# Patient Record
Sex: FEMALE | Race: WHITE | NOT HISPANIC OR LATINO | Employment: OTHER | URBAN - METROPOLITAN AREA
[De-identification: names, ages, dates, MRNs, and addresses within clinical notes are randomized per-mention and may not be internally consistent; named-entity substitution may affect disease eponyms.]

---

## 2017-04-11 ENCOUNTER — RECORDS - HEALTHEAST (OUTPATIENT)
Dept: LAB | Facility: CLINIC | Age: 82
End: 2017-04-11

## 2017-04-11 LAB
CHOLEST SERPL-MCNC: 278 MG/DL
FASTING STATUS PATIENT QL REPORTED: ABNORMAL
HDLC SERPL-MCNC: 51 MG/DL
LDLC SERPL CALC-MCNC: 187 MG/DL
TRIGL SERPL-MCNC: 198 MG/DL

## 2018-04-20 ENCOUNTER — RECORDS - HEALTHEAST (OUTPATIENT)
Dept: LAB | Facility: CLINIC | Age: 83
End: 2018-04-20

## 2018-04-20 LAB
CHOLEST SERPL-MCNC: 277 MG/DL
FASTING STATUS PATIENT QL REPORTED: ABNORMAL
HDLC SERPL-MCNC: 59 MG/DL
LDLC SERPL CALC-MCNC: 196 MG/DL
TRIGL SERPL-MCNC: 108 MG/DL

## 2018-04-22 LAB — BACTERIA SPEC CULT: ABNORMAL

## 2018-06-27 ENCOUNTER — RECORDS - HEALTHEAST (OUTPATIENT)
Dept: LAB | Facility: CLINIC | Age: 83
End: 2018-06-27

## 2018-06-27 LAB
AST SERPL W P-5'-P-CCNC: 20 U/L (ref 0–40)
CHOLEST SERPL-MCNC: 267 MG/DL
FASTING STATUS PATIENT QL REPORTED: ABNORMAL
HDLC SERPL-MCNC: 49 MG/DL
LDLC SERPL CALC-MCNC: 179 MG/DL
TRIGL SERPL-MCNC: 193 MG/DL

## 2019-02-19 ENCOUNTER — RECORDS - HEALTHEAST (OUTPATIENT)
Dept: LAB | Facility: CLINIC | Age: 84
End: 2019-02-19

## 2019-02-20 LAB — BACTERIA SPEC CULT: NO GROWTH

## 2019-07-12 ENCOUNTER — RECORDS - HEALTHEAST (OUTPATIENT)
Dept: LAB | Facility: CLINIC | Age: 84
End: 2019-07-12

## 2019-07-12 LAB
ALBUMIN SERPL-MCNC: 3.9 G/DL (ref 3.5–5)
ALP SERPL-CCNC: 65 U/L (ref 45–120)
ALT SERPL W P-5'-P-CCNC: 15 U/L (ref 0–45)
ANION GAP SERPL CALCULATED.3IONS-SCNC: 7 MMOL/L (ref 5–18)
AST SERPL W P-5'-P-CCNC: 21 U/L (ref 0–40)
BILIRUB SERPL-MCNC: 0.4 MG/DL (ref 0–1)
BUN SERPL-MCNC: 15 MG/DL (ref 8–28)
C REACTIVE PROTEIN LHE: 0.1 MG/DL (ref 0–0.8)
CALCIUM SERPL-MCNC: 10.2 MG/DL (ref 8.5–10.5)
CHLORIDE BLD-SCNC: 105 MMOL/L (ref 98–107)
CHOLEST SERPL-MCNC: 255 MG/DL
CO2 SERPL-SCNC: 28 MMOL/L (ref 22–31)
CREAT SERPL-MCNC: 0.67 MG/DL (ref 0.6–1.1)
FASTING STATUS PATIENT QL REPORTED: ABNORMAL
GFR SERPL CREATININE-BSD FRML MDRD: >60 ML/MIN/1.73M2
GLUCOSE BLD-MCNC: 72 MG/DL (ref 70–125)
HDLC SERPL-MCNC: 57 MG/DL
LDLC SERPL CALC-MCNC: 166 MG/DL
POTASSIUM BLD-SCNC: 4.5 MMOL/L (ref 3.5–5)
PROT SERPL-MCNC: 6.8 G/DL (ref 6–8)
SODIUM SERPL-SCNC: 140 MMOL/L (ref 136–145)
TRIGL SERPL-MCNC: 160 MG/DL

## 2020-09-21 ENCOUNTER — RECORDS - HEALTHEAST (OUTPATIENT)
Dept: LAB | Facility: CLINIC | Age: 85
End: 2020-09-21

## 2020-09-21 LAB
ERYTHROCYTE [DISTWIDTH] IN BLOOD BY AUTOMATED COUNT: 13.3 % (ref 11–14.5)
HCT VFR BLD AUTO: 45.6 % (ref 35–47)
HGB BLD-MCNC: 15 G/DL (ref 12–16)
MCH RBC QN AUTO: 30.3 PG (ref 27–34)
MCHC RBC AUTO-ENTMCNC: 32.9 G/DL (ref 32–36)
MCV RBC AUTO: 92 FL (ref 80–100)
PLATELET # BLD AUTO: 159 THOU/UL (ref 140–440)
PMV BLD AUTO: 10.5 FL (ref 8.5–12.5)
RBC # BLD AUTO: 4.95 MILL/UL (ref 3.8–5.4)
WBC: 4.2 THOU/UL (ref 4–11)

## 2020-09-24 LAB
25(OH)D3 SERPL-MCNC: 54.8 NG/ML (ref 30–80)
ALBUMIN SERPL-MCNC: 3.8 G/DL (ref 3.5–5)
ALP SERPL-CCNC: 66 U/L (ref 45–120)
ALT SERPL W P-5'-P-CCNC: 33 U/L (ref 0–45)
ANION GAP SERPL CALCULATED.3IONS-SCNC: 14 MMOL/L (ref 5–18)
AST SERPL W P-5'-P-CCNC: 38 U/L (ref 0–40)
BILIRUB SERPL-MCNC: 0.5 MG/DL (ref 0–1)
BUN SERPL-MCNC: 13 MG/DL (ref 8–28)
CALCIUM SERPL-MCNC: 9.3 MG/DL (ref 8.5–10.5)
CHLORIDE BLD-SCNC: 105 MMOL/L (ref 98–107)
CHOLEST SERPL-MCNC: 215 MG/DL
CO2 SERPL-SCNC: 22 MMOL/L (ref 22–31)
CREAT SERPL-MCNC: 0.73 MG/DL (ref 0.6–1.1)
FASTING STATUS PATIENT QL REPORTED: ABNORMAL
GFR SERPL CREATININE-BSD FRML MDRD: >60 ML/MIN/1.73M2
GLUCOSE BLD-MCNC: 90 MG/DL (ref 70–125)
HDLC SERPL-MCNC: 53 MG/DL
LDLC SERPL CALC-MCNC: 133 MG/DL
POTASSIUM BLD-SCNC: 4.7 MMOL/L (ref 3.5–5)
PROT SERPL-MCNC: 7.1 G/DL (ref 6–8)
SODIUM SERPL-SCNC: 141 MMOL/L (ref 136–145)
TRIGL SERPL-MCNC: 146 MG/DL
TSH SERPL DL<=0.005 MIU/L-ACNC: 1.57 UIU/ML (ref 0.3–5)
VIT B12 SERPL-MCNC: 909 PG/ML (ref 213–816)

## 2020-10-09 ENCOUNTER — AMBULATORY - HEALTHEAST (OUTPATIENT)
Dept: CARDIOLOGY | Facility: CLINIC | Age: 85
End: 2020-10-09

## 2020-10-09 ENCOUNTER — COMMUNICATION - HEALTHEAST (OUTPATIENT)
Dept: CARDIOLOGY | Facility: CLINIC | Age: 85
End: 2020-10-09

## 2020-10-09 ENCOUNTER — RECORDS - HEALTHEAST (OUTPATIENT)
Dept: ADMINISTRATIVE | Facility: OTHER | Age: 85
End: 2020-10-09

## 2020-10-12 ENCOUNTER — OFFICE VISIT - HEALTHEAST (OUTPATIENT)
Dept: CARDIOLOGY | Facility: CLINIC | Age: 85
End: 2020-10-12

## 2020-10-12 DIAGNOSIS — R06.09 DOE (DYSPNEA ON EXERTION): ICD-10-CM

## 2020-10-12 DIAGNOSIS — E78.00 PURE HYPERCHOLESTEROLEMIA: ICD-10-CM

## 2020-10-12 RX ORDER — IRON ASPGLY/C/B12/FA/CA-TH/SUC 70-150-1MG
1 TABLET ORAL DAILY
Status: SHIPPED | COMMUNITY
Start: 2020-10-12

## 2020-10-12 RX ORDER — UBIDECARENONE 100 MG
100 CAPSULE ORAL DAILY
Status: SHIPPED | COMMUNITY
Start: 2020-10-12

## 2020-10-12 RX ORDER — GLUCOSAMINE HCL 500 MG
1000 TABLET ORAL DAILY
Status: SHIPPED | COMMUNITY
Start: 2020-10-12

## 2020-10-12 RX ORDER — CHLORAL HYDRATE 500 MG
1 CAPSULE ORAL DAILY
Status: SHIPPED | COMMUNITY
Start: 2020-10-12

## 2020-10-12 RX ORDER — PRAVASTATIN SODIUM 40 MG
40 TABLET ORAL AT BEDTIME
Status: SHIPPED | COMMUNITY
Start: 2020-10-12

## 2020-10-12 ASSESSMENT — MIFFLIN-ST. JEOR: SCORE: 1049.63

## 2020-10-13 LAB — BNP SERPL-MCNC: 19 PG/ML (ref 0–167)

## 2020-11-10 ENCOUNTER — COMMUNICATION - HEALTHEAST (OUTPATIENT)
Dept: CARDIOLOGY | Facility: CLINIC | Age: 85
End: 2020-11-10

## 2020-11-10 DIAGNOSIS — E78.00 PURE HYPERCHOLESTEROLEMIA: ICD-10-CM

## 2020-11-10 DIAGNOSIS — R07.9 CHEST PAIN, UNSPECIFIED: ICD-10-CM

## 2020-11-10 DIAGNOSIS — R06.09 DOE (DYSPNEA ON EXERTION): ICD-10-CM

## 2020-11-13 ENCOUNTER — HOSPITAL ENCOUNTER (OUTPATIENT)
Dept: CARDIOLOGY | Facility: HOSPITAL | Age: 85
Discharge: HOME | End: 2020-11-13
Attending: INTERNAL MEDICINE

## 2020-11-13 DIAGNOSIS — E78.00 PURE HYPERCHOLESTEROLEMIA: ICD-10-CM

## 2020-11-13 DIAGNOSIS — R06.09 DOE (DYSPNEA ON EXERTION): ICD-10-CM

## 2020-11-13 LAB
AORTIC ROOT: 2.7 CM
AORTIC VALVE MEAN VELOCITY: 84.9 CM/S
AR DECEL SLOPE: 2500 MM/S2
AR PEAK VELOCITY: 430 CM/S
ASCENDING AORTA: 2.5 CM
AV DIMENSIONLESS INDEX VTI: 0.9
AV MEAN GRADIENT: 3 MMHG
AV PEAK GRADIENT: 5.7 MMHG
AV REGURGITANT PEAK GRADIENT: 74 MMHG
AV REGURGITATION PRESSURE HALF TIME: 504 MS
AV VALVE AREA: 2.6 CM2
BSA FOR ECHO PROCEDURE: 1.63 M2
CV BLOOD PRESSURE: ABNORMAL MMHG
CV ECHO HEIGHT: 60 IN
CV ECHO WEIGHT: 139 LBS
DOP CALC AO PEAK VEL: 119 CM/S
DOP CALC AO VTI: 25.7 CM
DOP CALC LVOT AREA: 2.83 CM2
DOP CALC LVOT DIAMETER: 1.9 CM
DOP CALC LVOT STROKE VOLUME: 67.2 CM3
DOP CALCLVOT PEAK VEL VTI: 23.7 CM
EJECTION FRACTION: 59 % (ref 55–75)
FRACTIONAL SHORTENING: 23.5 % (ref 28–44)
INTERVENTRICULAR SEPTUM IN END DIASTOLE: 1.1 CM (ref 0.6–0.9)
IVS/PW RATIO: 1.1
LA AREA 1: 14.9 CM2
LA AREA 2: 13.8 CM2
LEFT ATRIUM LENGTH: 4.52 CM
LEFT ATRIUM SIZE: 2.7 CM
LEFT ATRIUM TO AORTIC ROOT RATIO: 1 NO UNITS
LEFT ATRIUM VOLUME INDEX: 23.7 ML/M2
LEFT ATRIUM VOLUME: 38.7 ML
LEFT VENTRICLE CARDIAC INDEX: 2.6 L/MIN/M2
LEFT VENTRICLE CARDIAC OUTPUT: 4.2 L/MIN
LEFT VENTRICLE DIASTOLIC VOLUME INDEX: 33.1 CM3/M2 (ref 29–61)
LEFT VENTRICLE DIASTOLIC VOLUME: 54 CM3 (ref 46–106)
LEFT VENTRICLE HEART RATE: 62 BPM
LEFT VENTRICLE MASS INDEX: 65.2 G/M2
LEFT VENTRICLE SYSTOLIC VOLUME INDEX: 13.5 CM3/M2 (ref 8–24)
LEFT VENTRICLE SYSTOLIC VOLUME: 22 CM3 (ref 14–42)
LEFT VENTRICULAR INTERNAL DIMENSION IN DIASTOLE: 3.4 CM (ref 3.8–5.2)
LEFT VENTRICULAR INTERNAL DIMENSION IN SYSTOLE: 2.6 CM (ref 2.2–3.5)
LEFT VENTRICULAR MASS: 106.3 G
LEFT VENTRICULAR OUTFLOW TRACT MEAN GRADIENT: 3 MMHG
LEFT VENTRICULAR OUTFLOW TRACT MEAN VELOCITY: 74.7 CM/S
LEFT VENTRICULAR POSTERIOR WALL IN END DIASTOLE: 1 CM (ref 0.6–0.9)
LV STROKE VOLUME INDEX: 41.2 ML/M2
MITRAL VALVE E/A RATIO: 0.7
MV AVERAGE E/E' RATIO: 8.4 CM/S
MV DECELERATION TIME: 153 MS
MV E'TISSUE VEL-LAT: 7.21 CM/S
MV E'TISSUE VEL-MED: 7.7 CM/S
MV LATERAL E/E' RATIO: 8.7
MV MEDIAL E/E' RATIO: 8.2
MV PEAK A VELOCITY: 95.2 CM/S
MV PEAK E VELOCITY: 62.9 CM/S
NUC REST DIASTOLIC VOLUME INDEX: 2224 LBS
NUC REST SYSTOLIC VOLUME INDEX: 60 IN
TRICUSPID REGURGITATION PEAK PRESSURE GRADIENT: 20.8 MMHG
TRICUSPID VALVE ANULAR PLANE SYSTOLIC EXCURSION: 1.9 CM
TRICUSPID VALVE PEAK REGURGITANT VELOCITY: 228 CM/S

## 2020-11-13 ASSESSMENT — MIFFLIN-ST. JEOR: SCORE: 1002

## 2020-12-01 ENCOUNTER — HOSPITAL ENCOUNTER (OUTPATIENT)
Dept: CT IMAGING | Facility: CLINIC | Age: 85
Discharge: HOME | End: 2020-12-01
Attending: INTERNAL MEDICINE

## 2020-12-01 DIAGNOSIS — R07.9 CHEST PAIN, UNSPECIFIED: ICD-10-CM

## 2020-12-01 DIAGNOSIS — E78.00 PURE HYPERCHOLESTEROLEMIA: ICD-10-CM

## 2020-12-01 DIAGNOSIS — R06.09 DOE (DYSPNEA ON EXERTION): ICD-10-CM

## 2020-12-01 LAB
BSA FOR ECHO PROCEDURE: 0 M2
CREAT BLD-MCNC: 0.7 MG/DL (ref 0.6–1.1)
CV CALCIUM SCORE AGATSTON LM: 0
CV CALCIUM SCORING AGATSON LAD: 77
CV CALCIUM SCORING AGATSTON CX: 29
CV CALCIUM SCORING AGATSTON RCA: 1
CV CALCIUM SCORING AGATSTON TOTAL: 107
GFR SERPL CREATININE-BSD FRML MDRD: >60 ML/MIN/1.73M2
LEFT VENTRICLE HEART RATE: 76 BPM

## 2020-12-10 ENCOUNTER — COMMUNICATION - HEALTHEAST (OUTPATIENT)
Dept: CARDIOLOGY | Facility: CLINIC | Age: 85
End: 2020-12-10

## 2020-12-10 DIAGNOSIS — K76.9 LIVER LESION: ICD-10-CM

## 2020-12-10 DIAGNOSIS — R93.89 ABNORMAL CHEST CT: ICD-10-CM

## 2020-12-15 ENCOUNTER — HOSPITAL ENCOUNTER (OUTPATIENT)
Dept: ULTRASOUND IMAGING | Facility: HOSPITAL | Age: 85
Discharge: HOME | End: 2020-12-15
Attending: INTERNAL MEDICINE

## 2020-12-15 DIAGNOSIS — R93.89 ABNORMAL CHEST CT: ICD-10-CM

## 2020-12-15 DIAGNOSIS — K76.9 LIVER LESION: ICD-10-CM

## 2020-12-18 ENCOUNTER — AMBULATORY - HEALTHEAST (OUTPATIENT)
Dept: CARDIOLOGY | Facility: CLINIC | Age: 85
End: 2020-12-18

## 2020-12-18 ENCOUNTER — RECORDS - HEALTHEAST (OUTPATIENT)
Dept: ADMINISTRATIVE | Facility: OTHER | Age: 85
End: 2020-12-18

## 2020-12-22 ENCOUNTER — COMMUNICATION - HEALTHEAST (OUTPATIENT)
Dept: CARDIOLOGY | Facility: CLINIC | Age: 85
End: 2020-12-22

## 2020-12-23 ENCOUNTER — OFFICE VISIT - HEALTHEAST (OUTPATIENT)
Dept: CARDIOLOGY | Facility: CLINIC | Age: 85
End: 2020-12-23

## 2020-12-23 ENCOUNTER — AMBULATORY - HEALTHEAST (OUTPATIENT)
Dept: CARDIOLOGY | Facility: CLINIC | Age: 85
End: 2020-12-23

## 2020-12-23 DIAGNOSIS — U07.1 CLINICAL DIAGNOSIS OF COVID-19: ICD-10-CM

## 2020-12-23 DIAGNOSIS — I25.10 CORONARY ARTERY DISEASE DUE TO LIPID RICH PLAQUE: ICD-10-CM

## 2020-12-23 DIAGNOSIS — R06.09 DOE (DYSPNEA ON EXERTION): ICD-10-CM

## 2020-12-23 DIAGNOSIS — E78.00 PURE HYPERCHOLESTEROLEMIA: ICD-10-CM

## 2020-12-23 DIAGNOSIS — I25.83 CORONARY ARTERY DISEASE DUE TO LIPID RICH PLAQUE: ICD-10-CM

## 2020-12-23 RX ORDER — ALBUTEROL SULFATE 90 UG/1
2 AEROSOL, METERED RESPIRATORY (INHALATION) EVERY 4 HOURS PRN
Status: SHIPPED | COMMUNITY
Start: 2020-12-04

## 2020-12-23 ASSESSMENT — MIFFLIN-ST. JEOR: SCORE: 1056.43

## 2021-01-04 ENCOUNTER — RECORDS - HEALTHEAST (OUTPATIENT)
Dept: LAB | Facility: CLINIC | Age: 86
End: 2021-01-04

## 2021-01-06 LAB — BACTERIA SPEC CULT: ABNORMAL

## 2021-01-11 ENCOUNTER — SURGERY - HEALTHEAST (OUTPATIENT)
Dept: CARDIOLOGY | Facility: CLINIC | Age: 86
End: 2021-01-11

## 2021-01-11 ENCOUNTER — COMMUNICATION - HEALTHEAST (OUTPATIENT)
Dept: CARDIOLOGY | Facility: CLINIC | Age: 86
End: 2021-01-11

## 2021-01-11 ENCOUNTER — RECORDS - HEALTHEAST (OUTPATIENT)
Dept: LAB | Facility: CLINIC | Age: 86
End: 2021-01-11

## 2021-01-11 DIAGNOSIS — I25.10 CORONARY ARTERY DISEASE DUE TO LIPID RICH PLAQUE: ICD-10-CM

## 2021-01-11 DIAGNOSIS — I25.83 CORONARY ARTERY DISEASE DUE TO LIPID RICH PLAQUE: ICD-10-CM

## 2021-01-12 LAB — BACTERIA SPEC CULT: NO GROWTH

## 2021-01-15 ENCOUNTER — SURGERY - HEALTHEAST (OUTPATIENT)
Dept: CARDIOLOGY | Facility: HOSPITAL | Age: 86
End: 2021-01-15

## 2021-01-15 ASSESSMENT — MIFFLIN-ST. JEOR
SCORE: 1040.1
SCORE: 1043.73

## 2021-01-16 ASSESSMENT — MIFFLIN-ST. JEOR: SCORE: 1043.27

## 2021-03-03 ENCOUNTER — RECORDS - HEALTHEAST (OUTPATIENT)
Dept: LAB | Facility: CLINIC | Age: 86
End: 2021-03-03

## 2021-03-03 LAB
ALBUMIN SERPL-MCNC: 3.7 G/DL (ref 3.5–5)
ALP SERPL-CCNC: 70 U/L (ref 45–120)
ALT SERPL W P-5'-P-CCNC: 16 U/L (ref 0–45)
ANION GAP SERPL CALCULATED.3IONS-SCNC: 9 MMOL/L (ref 5–18)
AST SERPL W P-5'-P-CCNC: 20 U/L (ref 0–40)
BILIRUB SERPL-MCNC: 0.4 MG/DL (ref 0–1)
BUN SERPL-MCNC: 16 MG/DL (ref 8–28)
CALCIUM SERPL-MCNC: 8.8 MG/DL (ref 8.5–10.5)
CHLORIDE BLD-SCNC: 106 MMOL/L (ref 98–107)
CHOLEST SERPL-MCNC: 181 MG/DL
CO2 SERPL-SCNC: 27 MMOL/L (ref 22–31)
CREAT SERPL-MCNC: 0.7 MG/DL (ref 0.6–1.1)
FASTING STATUS PATIENT QL REPORTED: ABNORMAL
GFR SERPL CREATININE-BSD FRML MDRD: >60 ML/MIN/1.73M2
GLUCOSE BLD-MCNC: 102 MG/DL (ref 70–125)
HDLC SERPL-MCNC: 52 MG/DL
LDLC SERPL CALC-MCNC: 92 MG/DL
POTASSIUM BLD-SCNC: 3.9 MMOL/L (ref 3.5–5)
PROT SERPL-MCNC: 7 G/DL (ref 6–8)
SODIUM SERPL-SCNC: 142 MMOL/L (ref 136–145)
TRIGL SERPL-MCNC: 187 MG/DL
VIT B12 SERPL-MCNC: 590 PG/ML (ref 213–816)

## 2021-06-04 VITALS
HEIGHT: 63 IN | SYSTOLIC BLOOD PRESSURE: 126 MMHG | RESPIRATION RATE: 14 BRPM | HEART RATE: 82 BPM | WEIGHT: 139 LBS | BODY MASS INDEX: 24.63 KG/M2 | OXYGEN SATURATION: 98 % | DIASTOLIC BLOOD PRESSURE: 82 MMHG

## 2021-06-04 VITALS — WEIGHT: 139 LBS | BODY MASS INDEX: 27.29 KG/M2 | HEIGHT: 60 IN

## 2021-06-05 VITALS — HEIGHT: 64 IN | BODY MASS INDEX: 22.9 KG/M2 | WEIGHT: 134.1 LBS

## 2021-06-05 VITALS
SYSTOLIC BLOOD PRESSURE: 124 MMHG | WEIGHT: 137 LBS | BODY MASS INDEX: 23.39 KG/M2 | HEIGHT: 64 IN | HEART RATE: 103 BPM | RESPIRATION RATE: 14 BRPM | OXYGEN SATURATION: 98 % | DIASTOLIC BLOOD PRESSURE: 64 MMHG

## 2021-06-12 NOTE — PATIENT INSTRUCTIONS - HE
Ms. Emely KEVIN Coleman,  It certainly was nice to meet you today.  Per our conversation you're having some shortness of breath.  This could represent heart failure or an abnormal heart and the reason I am checking the ultrasound of the heart, known as an ECHOCARDIOGRAM and the heart stress test know as a NUCLEAR STRESS TEST. I will also check the blood test for heart failure, altho doubt today or BNP.  We will call you the results of these tests.   Sandro Perales

## 2021-06-12 NOTE — TELEPHONE ENCOUNTER
Wellness Screening Tool  Symptom Screening:  Do you have one of the following NEW symptoms:    Fever (subjective or >100.0)?  No    A new cough?  No    Shortness of breath?  No     Chills? No     New loss of taste or smell? No     Generalized body aches? No     New persistent headache? No     New sore throat? No     Nausea, vomiting, or diarrhea?  No    Within the past 2 weeks, have you been exposed to someone with a known positive illness below:    COVID-19 (known or suspected)?  No    Chicken pox?  No    Mealses?  No    Pertussis?  No    Patient notified of visitor policy- They may have one person accompany them to their appointment, but they will need to wear a mask and will be screened upon arrival for symptoms: Yes  Pt informed to wear a mask: Yes  Pt notified if they develop any symptoms listed above, prior to their appointment, they are to call the clinic directly at [phone] for further instructions.  Yes  Patient's appointment status: Patient will be seen in clinic as scheduled on 10/12

## 2021-06-13 NOTE — TELEPHONE ENCOUNTER
----- Message from Leeann James sent at 12/10/2020 10:07 AM CST -----  Regarding: [facility] PT / US FOR LIVER  General phone call:    Caller: Emely Barker     Primary cardiologist: VANIA     Detailed reason for call: Emely states that she received her CTA results in a letter and is requesting for an ultrasound of her liver to be ordered by [facility]. Please return pt's call.     Best phone number: [phone]    Best time to contact: Anytime     Ok to leave a detailed message? Yes     Device? No     Additional Info:

## 2021-06-13 NOTE — TELEPHONE ENCOUNTER
Abbey Perales MD   12/1/2020  4:08 PM CST      Please inform this 85-year-old lady that CTA suggests disease in the ostial right coronary artery, as well as disease in the proximal diagonal, neither of them appear to be critical but given her symptoms and high cholesterol I suggest we perform invasive angiography and possible intervention.  Lastly, given CAT scan suggesting a shadow in the liver would like to get liver ultrasound.  Can you arrange these?             Order placed for ultrasound of liver. Called patient and updated her that this was done. She verbalized understanding and had previously wanted to hold off until discussing with [facility] on 12/23 but did receive her results in the mail and was comfortable with doing the ultrasound. Informed her that order was placed and that [facility] will call her to arrange. She prefers [place]'s location. We will see her on 12/23 to discuss cor poss. -[facility] [place]

## 2021-06-13 NOTE — TELEPHONE ENCOUNTER
----- Message from Abbey Perales MD sent at 11/9/2020  9:01 AM CST -----  Insurance reviewer called me today, they will approve a CTA for this patient rather than a stress nuclear, can we then please change order to CTA with calcium score?  I did explain that this is not as specific and if it does suggest disease will end up needing to do a nuclear stress in any event and they are fine with that, they approved CTA.LF    Reason for Exam  Priority: Routine  Dyspnea on exertion   Dx: MIXON (dyspnea on exertion) [R06.00 (ICD-10-CM)]; Pure hypercholesterolemia [E78.00 (ICD-10-CM)]     CCTA with calcium score ordered.  Pt updated and verbalized understanding.  NM stress canceled.  yanely

## 2021-06-14 NOTE — TELEPHONE ENCOUNTER
Emely Coleman  [address]  [facility] [address]  [phone] (home)     Primary cardiologist:  VANIA  PCP:  Coretta Julian MD  Procedure: cor angio poss pci  H&P completed by:  12/23/20  Case MD:  RO/DAISY  Admit date and time:  1/15  Case start time:  Arrival time 0600  Ordering MD:  VANIA  Diagnosis:  Abnormal CTA  Anticoagulation: None  CPAP: No  Bypass Grafts: No  Renal Issues: No  Allergies: confirmed allergy list  Diabetic?: No  Device?: No      Angiogram Teaching    Reason for Visit:  Telephone call to discuss pre-procedure education in preparation for: 1/11/2021    Procedure Prep:  EKG results obtained, dated: on admission  Pertinent test results obtained - Viewable in Epic, dated: abnormal CTA  Hemogram results obtained: on admission  Basic Metabolic Panel results obtained: on admission  Lipid Profile results obtained: on admission    Pre-procedure instructions  Patient instructed to be NPO after midnight.  Patient instructed to arrange for transportation home following procedure.  Patient instructed to have a responsible adult with them for 24 hours post-procedure.  Post-procedure follow up process.  Conscious sedation discussed.  The patient was sent the pre-procedure letter (If requested)    Pre-procedure medication instructions  Patient instructed on antiplatelet medication.  Continue medications as scheduled, with a small amount of water on the day of the procedure unless indicated.  Patient instructed to take 325 mg of Aspirin am of procedure: Yes  Other medication: instructed to take aspirin the morning of the procedure with sips of water. Pt takes other medications at night time.   *PATIENTS RECORDS AVAILABLE IN [facility] UNLESS OTHERWISE INDICATED*    *Order set was entered on this date: 1/11      Patient Active Problem List   Diagnosis     MIXON (dyspnea on exertion)     Pure hypercholesterolemia     Clinical diagnosis of COVID-19     Coronary artery disease due to lipid rich plaque       Current  Outpatient Medications   Medication Sig Dispense Refill     albuterol (PROAIR HFA;PROVENTIL HFA;VENTOLIN HFA) 90 mcg/actuation inhaler Inhale 2 puffs every 4 (four) hours as needed.       coenzyme Q10 (CO Q-10) 100 mg capsule Take 100 mg by mouth daily.       fish oil-omega-3 fatty acids 300-1,000 mg capsule Take 1 capsule by mouth daily.       FLAXSEED ORAL Take 5 mL by mouth daily. Patient reported she takes one teaspoonful of ground flaxseeds daily.       GARLIC ORAL Take 1 tablet by mouth daily.       glucosamine HCl 500 mg Tab Take 1,000 mg by mouth daily.       multivitamin-iron-folic acid  mg-mcg Tab Take 1 tablet by mouth daily.       mv-mn/iron/folic acid/herb 190 (VITAMIN D3 COMPLETE ORAL) Take 1 tablet by mouth daily.       pravastatin (PRAVACHOL) 40 MG tablet Take 40 mg by mouth at bedtime.       No current facility-administered medications for this visit.        Allergies   Allergen Reactions     Chocolate Other (See Comments)     Raspberry Other (See Comments)     Strawberry Other (See Comments)       Plan  Pt's daughter will be   No COVID test needed, pt had a positive swab 12/4/2020  Patient ready for procedure    RN Maribell Trujillo

## 2021-06-14 NOTE — TELEPHONE ENCOUNTER
Wellness Screening Tool  Symptom Screening:  Do you have one of the following NEW symptoms:    Fever (subjective or >100.0)?  No    A new cough?  No    Shortness of breath?  No     Chills? No     New loss of taste or smell? No     Generalized body aches? No     New persistent headache? No     New sore throat? No     Nausea, vomiting, or diarrhea?  No    Within the past 2 weeks, have you been exposed to someone with a known positive illness below:    COVID-19 (known or suspected)?  No    Chicken pox?  No    Mealses?  No    Pertussis?  No    Patient notified of visitor policy- No visitors are allowed to accompany the patient at this time. daughter will be coming with  Pt informed to wear a mask: Yes  Pt notified if they develop any symptoms listed above, prior to their appointment, they are to call the clinic directly at [phone] for further instructions.  Yes  Patient's appointment status: Patient will be seen in clinic as scheduled on 12/23       
losing balance/decreased weight-shifting ability

## 2021-06-14 NOTE — TELEPHONE ENCOUNTER
----- Message from Darlyn Mohr sent at 1/5/2021 12:14 PM CST -----  Regarding: [facility] ordering  CA P.PCI scheduled 01/15 admit at 6 with CJP or PTK

## 2021-06-14 NOTE — PROGRESS NOTES
Case request already placed by [facility]. Encounter opened in error. -[facility] [place]

## 2021-06-14 NOTE — PATIENT INSTRUCTIONS - HE
Ms Emely Coleman,  I enjoyed visiting with you again today.  I am not too concerned with brief chest discomfort but more concerned about the CAT scan as well as the shortness of breath.    Per our conversation we will arrange coronary angiography and possible stenting if needed.  I would like you to increase your pravastatin up to 80 mg.  I will plan on seeing you afterwards.  Have a good holiday.  Sandro Perales

## 2021-06-16 PROBLEM — I25.83 CORONARY ARTERY DISEASE DUE TO LIPID RICH PLAQUE: Status: ACTIVE | Noted: 2020-12-23

## 2021-06-16 PROBLEM — I25.10 CORONARY ARTERY DISEASE DUE TO LIPID RICH PLAQUE: Status: ACTIVE | Noted: 2020-12-23

## 2021-06-16 PROBLEM — U07.1 CLINICAL DIAGNOSIS OF COVID-19: Status: ACTIVE | Noted: 2020-12-23

## 2021-06-16 PROBLEM — E78.2 MIXED HYPERLIPIDEMIA: Chronic | Status: ACTIVE | Noted: 2021-01-16

## 2021-06-29 NOTE — PROGRESS NOTES
Progress Notes by Abbey Perales MD at 10/12/2020  1:50 PM     Author: Abbey Perales MD Service: -- Author Type: Physician    Filed: 10/12/2020  2:35 PM Encounter Date: 10/12/2020 Status: Signed    : Abbey Perales MD (Physician)         [facility] Heart Care Office Consult     Assessment:     1. MIXON (dyspnea on exertion) -associated with dry cough, does not necessarily sound ischemic.  Normal hemoglobin and normal chest CT.  Do not hear any murmurs on exam but will check echocardiogram for ejection fraction.  Will check BNP today looking for heart failure.  Lastly, agree with exercise stress nuclear and if medium or large sized area of ischemia we then pursue angiography.  If all these tests are unremarkable, then would assume we could quite possibly be related to side effect of her current medications and I would suggest stopping all medicines and supplements, if symptoms improve reinstitute one at a time to see which causes the issue, if stopping all supplements does not make things better then would just simply go back on them and do noncardiac or pulmonary work-up.  ECG is unremarkable and hemoglobin is preserved at 15.   2. Pure hypercholesterolemia -unacceptable numbers a total 215 and LDL of 133, however if coronary artery disease with certainly needs up dose of statin.      Plan:   1.  Check BNP today and if elevated addressed with diuretics.  2.  Check echocardiogram and if issues addressed.  3.  Check exercise stress nuclear and if medium or large sized area of ischemia angiography.  4.  Follow-up only if above tests abnormal.  5.  If above tests normal, would suggest discontinuing all medicines and supplements, and if symptoms resolve slowly adding back above to see if the offending agent can be identified.    History of Present Illness:   Thank you for asking the [facility] Heart Care team to see Emely Coleman a 85 y.o.  female  in consultation  to evaluate shortness of breath.    Patient tells me she has been in usual state of health which includes chronic dry cough for about 3 years.  Over this period of time she is also had a musculoskeletal type pain sensation under the left breast coming and going at rest.  Over the last 4 months she is noted increased shortness of breath on activity, her daughter pointed it out to her when she came to answer the phone.  There is no PND, orthopnea, increased peripheral edema, syncope or dizziness.    Past Medical History:   Pure hypercholesterolemia    Past history is negative for cancer, tuberculosis, diabetes mellitus, myocardial infarction,  rheumatic fever, hypertension, cerebrovascular accident, chronic kidney disease, peptic ulcer disease, chronic obstructive pulmonary disease, or thyroid disorder.    Past Surgical History:   No past surgical history on file.    Family History:   Family history positive for mother  in her 80s from heart failure, no history of coronary artery disease in the family.    Social History:   She lives at home independently with her , he has Alzheimer's and she is his caregiver, reports that she has never smoked. She has never used smokeless tobacco. She reports that she does not use drugs or alcohol. The primary care physician is Coretta Julian MD    Meds:   Scheduled Meds:  Current Outpatient Medications   Medication Sig Dispense Refill   ? coenzyme Q10 (CO Q-10) 100 mg capsule Take 100 mg by mouth daily.     ? fish oil-omega-3 fatty acids 300-1,000 mg capsule Take 1 capsule by mouth daily.     ? FLAXSEED ORAL Take 5 mL by mouth daily. Patient reported she takes one teaspoonful of ground flaxseeds daily.     ? GARLIC ORAL Take 1 tablet by mouth daily.     ? glucosamine HCl 500 mg Tab Take 1,000 mg by mouth daily.     ? multivitamin-iron-folic acid  mg-mcg Tab Take 1 tablet by mouth daily.     ? mv-mn/iron/folic acid/herb 190 (VITAMIN D3 COMPLETE ORAL) Take 1 tablet by mouth daily.     ? pravastatin  "(PRAVACHOL) 40 MG tablet Take 40 mg by mouth at bedtime.       No current facility-administered medications for this visit.        PRN Meds:.    Allergies:   Chocolate, Raspberry, and Strawberry    Objective:      Physical Exam  139 lb (63 kg)  5' 3\" (1.6 m)  Body mass index is 24.62 kg/m .  /82 (Patient Site: Left Arm, Patient Position: Sitting, Cuff Size: Adult Regular)   Pulse 82   Resp 14   Ht 5' 3\" (1.6 m)   Wt 139 lb (63 kg)   SpO2 98%   BMI 24.62 kg/m      General Appearance:   Alert, cooperative and in no acute distress.   HEENT:  No scleral icterus; the mucous membranes were pink and moist.   Neck: JVP normal. No thyromegaly. No HJR   Chest: The spine was straight. The chest was symmetric.   Lungs:   Respirations unlabored; the lungs are clear to auscultation.   Cardiovascular:   S1 and S2 without murmur, clicks or rubs. Brachial, radial, carotid and posterior tibial pulses are intact and symetrical.  No carotid bruits noted   Abdomen:  No organomegaly, masses, bruits, or tenderness. Bowels sounds are present   Extremities: No cyanosis, clubbing, or edema.   Skin: No xanthelasma.   Neurologic: Mood and affect are appropriate.         Lab Reviewed Personally by myself  Lab Results   Component Value Date     09/21/2020    K 4.7 09/21/2020     09/21/2020    CO2 22 09/21/2020    BUN 13 09/21/2020    CREATININE 0.73 09/21/2020    CALCIUM 9.3 09/21/2020     Lab Results   Component Value Date    WBC 4.2 09/21/2020    HGB 15.0 09/21/2020    HCT 45.6 09/21/2020    MCV 92 09/21/2020     09/21/2020     Lab Results   Component Value Date    CHOL 215 (H) 09/21/2020    TRIG 146 09/21/2020    HDL 53 09/21/2020     No results found for: BNP    ECG personally reviewed by myself shows normal sinus rhythm, within normal limits     Review of Systems:     Review of Systems:   General: WNL  Eyes: WNL  Ears/Nose/Throat: Hearing Loss  Lungs: Cough, Shortness of Breath  Heart: Shortness of Breath with " activity, Leg Swelling  Stomach: Heartburn  Bladder: WNL  Muscle/Joints: Muscle Pain  Skin: Rash  Nervous System: Daytime Sleepiness  Mental Health: Anxiety     Blood: WNL

## 2021-06-30 NOTE — PROGRESS NOTES
Progress Notes by Abbey Perales MD at 12/23/2020  2:50 PM     Author: Abbey Perales MD Service: -- Author Type: Physician    Filed: 12/23/2020  3:31 PM Encounter Date: 12/23/2020 Status: Signed    : Abbey Perales MD (Physician)           [facility]  Heart Care Clinic Follow-up Note    Assessment & Plan        1. Coronary artery disease due to lipid rich plaque -coronary CTA suggest normal left main, left anterior descending with mid 50 to 70% stenosis, diagonal normal, circumflex normal with first obtuse marginal artery 25 to 50% stenosis, and right coronary artery possibly with a 75% obstructive lesion in the proximal portion.  Given this, will arrange for invasive angiography and possible intervention.  Risk, benefits, constipation discussed with her and she is willing to proceed.   2. Pure hypercholesterolemia -cholesterol 215 with an LDL of 133 which is unacceptable.  If significant coronary artery disease would increase Pravachol up to 80 mg.   3. MIXON (dyspnea on exertion) -possibly an element of pulmonary fibrosis but also possibly coronary artery disease and as above need to evaluate for this.   4. Clinical diagnosis of COVID-19 -2 weeks ago on mild symptoms.     Plan  1 invasive angiography and possible intervention.  2.  Increase pravastatin to 80 mg.  3.. Follow-up with me thereafter.    Subjective  CC: 85-year-old white female being seen in follow-up today.  Since have seen her she was diagnosed with COVID-19 secondary to increased shortness of breath and a cough.  She still was at home independently taking care of her  who has Alzheimer's disease.  She does complain of shortness of breath on activity which is getting worse over the last 2 to 3 months.  She did have an atypical type chest stabbing, common going for 9 episodes each lasting less than a minute while at shampooing a rug.  No significant syncope, dizziness or peripheral edema.    Medications  Current  "Outpatient Medications   Medication Sig   ? albuterol (PROAIR HFA;PROVENTIL HFA;VENTOLIN HFA) 90 mcg/actuation inhaler Inhale 2 puffs every 4 (four) hours as needed.   ? coenzyme Q10 (CO Q-10) 100 mg capsule Take 100 mg by mouth daily.   ? fish oil-omega-3 fatty acids 300-1,000 mg capsule Take 1 capsule by mouth daily.   ? FLAXSEED ORAL Take 5 mL by mouth daily. Patient reported she takes one teaspoonful of ground flaxseeds daily.   ? GARLIC ORAL Take 1 tablet by mouth daily.   ? glucosamine HCl 500 mg Tab Take 1,000 mg by mouth daily.   ? multivitamin-iron-folic acid  mg-mcg Tab Take 1 tablet by mouth daily.   ? mv-mn/iron/folic acid/herb 190 (VITAMIN D3 COMPLETE ORAL) Take 1 tablet by mouth daily.   ? pravastatin (PRAVACHOL) 40 MG tablet Take 40 mg by mouth at bedtime.       Objective  /64 (Patient Site: Right Arm, Patient Position: Sitting, Cuff Size: Adult Regular)   Pulse (!) 103   Resp 14   Ht 5' 4\" (1.626 m)   Wt 137 lb (62.1 kg) Comment: With shoes.  SpO2 98%   BMI 23.52 kg/m      General Appearance:    Alert, cooperative, no distress, appears stated age   Head:    Normocephalic, without obvious abnormality, atraumatic   Throat:   Lips, mucosa, and tongue normal; teeth and gums normal   Neck:   Supple, symmetrical, trachea midline, no adenopathy;        thyroid:  No enlargement/tenderness/nodules; no carotid    bruit or JVD   Back:     Symmetric, no curvature, ROM normal, no CVA tenderness   Lungs:     Clear to auscultation bilaterally, respirations unlabored   Chest wall:    No tenderness or deformity   Heart:    Regular rate and rhythm, S1 and S2 normal, no murmur, rub   or gallop   Abdomen:     Soft, non-tender, bowel sounds active all four quadrants,     no masses, no organomegaly   Extremities:   Normal, atraumatic, no cyanosis or edema   Pulses:   2+ and symmetric all extremities   Skin:   Skin color, texture, turgor normal, no rashes or lesions     Results    Lab Results personally " reviewed   Lab Results   Component Value Date    CHOL 215 (H) 09/21/2020    CHOL 255 (H) 07/12/2019     Lab Results   Component Value Date    HDL 53 09/21/2020    HDL 57 07/12/2019     Lab Results   Component Value Date    LDLCALC 133 (H) 09/21/2020    LDLCALC 166 (H) 07/12/2019     Lab Results   Component Value Date    TRIG 146 09/21/2020    TRIG 160 (H) 07/12/2019     Lab Results   Component Value Date    WBC 5.8 12/21/2020    HGB 15.5 12/21/2020    HCT 46.9 12/21/2020     12/21/2020     Lab Results   Component Value Date    CREATININE 0.71 12/21/2020    BUN 15 12/21/2020     12/21/2020    K 3.9 12/21/2020    CO2 27 12/21/2020     Review of Systems:   General: Weight Loss  Eyes: WNL  Ears/Nose/Throat: WNL  Lungs: Cough, Shortness of Breath  Heart: Chest Pain, Shortness of Breath with activity  Stomach: WNL  Bladder: WNL  Muscle/Joints: WNL  Skin: WNL  Nervous System: WNL  Mental Health: WNL     Blood: WNL

## 2022-12-05 ENCOUNTER — HOSPITAL ENCOUNTER (EMERGENCY)
Facility: HOSPITAL | Age: 87
Discharge: HOME | End: 2022-12-05
Attending: EMERGENCY MEDICINE | Admitting: EMERGENCY MEDICINE
Payer: COMMERCIAL

## 2022-12-05 ENCOUNTER — APPOINTMENT (OUTPATIENT)
Dept: MRI IMAGING | Facility: HOSPITAL | Age: 87
End: 2022-12-05
Attending: EMERGENCY MEDICINE
Payer: COMMERCIAL

## 2022-12-05 ENCOUNTER — APPOINTMENT (OUTPATIENT)
Dept: CT IMAGING | Facility: HOSPITAL | Age: 87
End: 2022-12-05
Attending: STUDENT IN AN ORGANIZED HEALTH CARE EDUCATION/TRAINING PROGRAM
Payer: COMMERCIAL

## 2022-12-05 VITALS
SYSTOLIC BLOOD PRESSURE: 170 MMHG | OXYGEN SATURATION: 98 % | HEART RATE: 73 BPM | DIASTOLIC BLOOD PRESSURE: 77 MMHG | RESPIRATION RATE: 18 BRPM | TEMPERATURE: 97.8 F | BODY MASS INDEX: 21.46 KG/M2 | WEIGHT: 125 LBS

## 2022-12-05 DIAGNOSIS — G51.0 BELL'S PALSY: ICD-10-CM

## 2022-12-05 LAB
ANION GAP SERPL CALCULATED.3IONS-SCNC: 8 MMOL/L (ref 7–15)
APTT PPP: 27 SECONDS (ref 22–38)
BASOPHILS # BLD AUTO: 0 10E3/UL (ref 0–0.2)
BASOPHILS NFR BLD AUTO: 0 %
BUN SERPL-MCNC: 14 MG/DL (ref 8–23)
CALCIUM SERPL-MCNC: 9.5 MG/DL (ref 8.8–10.2)
CHLORIDE SERPL-SCNC: 102 MMOL/L (ref 98–107)
CREAT SERPL-MCNC: 0.73 MG/DL (ref 0.51–0.95)
DEPRECATED HCO3 PLAS-SCNC: 30 MMOL/L (ref 22–29)
EOSINOPHIL # BLD AUTO: 0.1 10E3/UL (ref 0–0.7)
EOSINOPHIL NFR BLD AUTO: 2 %
ERYTHROCYTE [DISTWIDTH] IN BLOOD BY AUTOMATED COUNT: 13.3 % (ref 10–15)
GFR SERPL CREATININE-BSD FRML MDRD: 80 ML/MIN/1.73M2
GLUCOSE BLDC GLUCOMTR-MCNC: 120 MG/DL (ref 70–99)
GLUCOSE SERPL-MCNC: 115 MG/DL (ref 70–99)
HCT VFR BLD AUTO: 47.1 % (ref 35–47)
HGB BLD-MCNC: 15.5 G/DL (ref 11.7–15.7)
IMM GRANULOCYTES # BLD: 0 10E3/UL
IMM GRANULOCYTES NFR BLD: 0 %
INR PPP: 0.99 (ref 0.85–1.15)
LYMPHOCYTES # BLD AUTO: 1.6 10E3/UL (ref 0.8–5.3)
LYMPHOCYTES NFR BLD AUTO: 26 %
MCH RBC QN AUTO: 30.5 PG (ref 26.5–33)
MCHC RBC AUTO-ENTMCNC: 32.9 G/DL (ref 31.5–36.5)
MCV RBC AUTO: 93 FL (ref 78–100)
MONOCYTES # BLD AUTO: 0.5 10E3/UL (ref 0–1.3)
MONOCYTES NFR BLD AUTO: 9 %
NEUTROPHILS # BLD AUTO: 3.9 10E3/UL (ref 1.6–8.3)
NEUTROPHILS NFR BLD AUTO: 63 %
NRBC # BLD AUTO: 0 10E3/UL
NRBC BLD AUTO-RTO: 0 /100
PLATELET # BLD AUTO: 213 10E3/UL (ref 150–450)
POTASSIUM SERPL-SCNC: 4.3 MMOL/L (ref 3.4–5.3)
RBC # BLD AUTO: 5.09 10E6/UL (ref 3.8–5.2)
SODIUM SERPL-SCNC: 140 MMOL/L (ref 136–145)
TROPONIN T SERPL HS-MCNC: 8 NG/L
WBC # BLD AUTO: 6.2 10E3/UL (ref 4–11)

## 2022-12-05 PROCEDURE — 99207 PR NO CHARGE LOS: CPT | Performed by: PHYSICIAN ASSISTANT

## 2022-12-05 PROCEDURE — 80048 BASIC METABOLIC PNL TOTAL CA: CPT | Performed by: EMERGENCY MEDICINE

## 2022-12-05 PROCEDURE — 70553 MRI BRAIN STEM W/O & W/DYE: CPT

## 2022-12-05 PROCEDURE — 93005 ELECTROCARDIOGRAM TRACING: CPT | Performed by: EMERGENCY MEDICINE

## 2022-12-05 PROCEDURE — 85730 THROMBOPLASTIN TIME PARTIAL: CPT | Performed by: EMERGENCY MEDICINE

## 2022-12-05 PROCEDURE — A9585 GADOBUTROL INJECTION: HCPCS | Performed by: EMERGENCY MEDICINE

## 2022-12-05 PROCEDURE — 85610 PROTHROMBIN TIME: CPT | Performed by: EMERGENCY MEDICINE

## 2022-12-05 PROCEDURE — 70496 CT ANGIOGRAPHY HEAD: CPT

## 2022-12-05 PROCEDURE — 36415 COLL VENOUS BLD VENIPUNCTURE: CPT | Performed by: EMERGENCY MEDICINE

## 2022-12-05 PROCEDURE — 99285 EMERGENCY DEPT VISIT HI MDM: CPT | Mod: 25

## 2022-12-05 PROCEDURE — 250N000011 HC RX IP 250 OP 636: Performed by: EMERGENCY MEDICINE

## 2022-12-05 PROCEDURE — 255N000002 HC RX 255 OP 636: Performed by: EMERGENCY MEDICINE

## 2022-12-05 PROCEDURE — 84484 ASSAY OF TROPONIN QUANT: CPT | Performed by: EMERGENCY MEDICINE

## 2022-12-05 PROCEDURE — 70498 CT ANGIOGRAPHY NECK: CPT

## 2022-12-05 PROCEDURE — 85025 COMPLETE CBC W/AUTO DIFF WBC: CPT | Performed by: EMERGENCY MEDICINE

## 2022-12-05 RX ORDER — VALACYCLOVIR HYDROCHLORIDE 1 G/1
1000 TABLET, FILM COATED ORAL 3 TIMES DAILY
Qty: 21 TABLET | Refills: 0 | Status: SHIPPED | OUTPATIENT
Start: 2022-12-05 | End: 2022-12-12

## 2022-12-05 RX ORDER — MINERAL OIL, PETROLATUM 425; 568 MG/G; MG/G
1 OINTMENT OPHTHALMIC AT BEDTIME
Qty: 7 G | Refills: 0 | Status: SHIPPED | OUTPATIENT
Start: 2022-12-05

## 2022-12-05 RX ORDER — IOPAMIDOL 755 MG/ML
75 INJECTION, SOLUTION INTRAVASCULAR ONCE
Status: COMPLETED | OUTPATIENT
Start: 2022-12-05 | End: 2022-12-05

## 2022-12-05 RX ORDER — GADOBUTROL 604.72 MG/ML
6 INJECTION INTRAVENOUS ONCE
Status: COMPLETED | OUTPATIENT
Start: 2022-12-05 | End: 2022-12-05

## 2022-12-05 RX ADMIN — IOPAMIDOL 75 ML: 755 INJECTION, SOLUTION INTRAVENOUS at 09:41

## 2022-12-05 RX ADMIN — GADOBUTROL 6 ML: 604.72 INJECTION INTRAVENOUS at 12:44

## 2022-12-05 ASSESSMENT — ACTIVITIES OF DAILY LIVING (ADL)
ADLS_ACUITY_SCORE: 35
ADLS_ACUITY_SCORE: 35

## 2022-12-05 NOTE — ED PROVIDER NOTES
EMERGENCY DEPARTMENT ENCOUNTER      NAME: Emely Coleman  AGE: 87 year old female  YOB: 1935  MRN: [id number]  EVALUATION DATE & TIME: No admission date for patient encounter.    PCP: Coretta Julian    ED PROVIDER: Germania Chavez M.D.      Chief Complaint   Patient presents with     Numbness         FINAL IMPRESSION:  1. Bell's palsy          ED COURSE & MEDICAL DECISION MAKING:    ED Course as of 12/05/22 1337   Mon Dec 05, 2022   0926 Pt with NIHSS 2 left lower facial droop and forehead sparing, last known normal 12.5h ago, wakeup symptoms apparent at 7am, with left ear pain and examination of Tms symmetrical and WNL. NIHSS 2 with slight sensation of decreased touch to right cheek compared to left, tier 2 stroke code called with forehead sparing in setting of patient with multiple stroke risk factors including HLD and CAD and forehead sparing and I spoke with stroke neurology, who agrees CT and CTA and will assess patient and determine MRI vs. Hyperacute MRI based on imaging and their assessment, stroke code and patient in MRI with  and    0936 CT head with no acute intracranial hemorrhage or mass apparent per [place] radiology, cTA underway   0944 Per stroke neuro routine MRI with and without contrast brain recommended, deescalate stroke code   0946 I spoke with neuroradiology who notes CTA reassuringly without acute pathology   1316 MRI brain reassuringly without new pathology apparent, neurology paged to discuss dispo   1334 I spoke with stroke neuro who agrees ok to discharge with diagnosis [place] Palsy       9:20 AM I introduced myself to the patient, obtained patient history, performed a physical exam, and discussed plan for ED workup including potential diagnostic laboratory/imaging studies and interventions. Tier II stroke code initiated.  9:28 AM Spoke with Nayla Madison PA-C, stroke neurology. Discussed patient's case.  9:44 AM Spoke with Nayla Madison PA-C, stroke neurology.  Discussed CT results and plan for MRI.  9:46 AM Stroke code deescalated.    Pertinent Labs & Imaging studies reviewed. (See chart for details)    N95 worn  A face shield was worn also  COVID PPE      At the conclusion of the encounter I discussed the results of all of the tests and the disposition. The questions were answered. The patient or family acknowledged understanding and was agreeable with the care plan.     MEDICATIONS GIVEN IN THE EMERGENCY:  Medications   iopamidol (ISOVUE-370) solution 75 mL (75 mLs Intravenous Given 12/5/22 0941)   gadobutrol (GADAVIST) injection 6 mL (6 mLs Intravenous Given 12/5/22 1244)       NEW PRESCRIPTIONS STARTED AT TODAY'S ER VISIT  New Prescriptions    VALACYCLOVIR (VALTREX) 1000 MG TABLET    Take 1 tablet (1,000 mg) by mouth 3 times daily for 7 days    WHITE PETROLATUM-MINERAL OIL (REFRESH LACRI-LUBE) OINT    Apply 1 inch to eye At Bedtime To prevent dry eye during [place] Palsy          =================================================================    HPI      Emely Coleman is a 87 year old female with PMHx of HLD and CAD who presents to the ED today via private car escorted by daughter with ear pain.    The patient has no history of stroke, diabetes, blood thinners, intracranial masses, or recent falls.    The patient reports she went to bed around 2100 last night and woke up around 0700 this morning with left ear pain and an atypical decreased sensation in her right cheek compared to the left. When she looked in the mirror this morning she noticed a left lower facial droop. The left ear pain is achy, constant, 5/10, and nonradiating. She denies tinnitus, hearing loss, vertigo, and fever.    REVIEW OF SYSTEMS   All other systems reviewed and are negative except as noted above in HPI.    PAST MEDICAL HISTORY:  Past Medical History:   Diagnosis Date     COVID-19     12/04/2020     MIXON (dyspnea on exertion)     per chart     Hyperlipidemia        PAST SURGICAL HISTORY:  Past  Surgical History:   Procedure Laterality Date     CV CORONARY ANGIOGRAM N/A 1/15/2021    Procedure: Coronary Angiogram;  Surgeon: Dylan Belle MD;  Location: [facility];  Service: Cardiology     HERNIA REPAIR      long time ago       CURRENT MEDICATIONS:    valACYclovir (VALTREX) 1000 mg tablet  White Petrolatum-Mineral Oil (REFRESH LACRI-LUBE) OINT  albuterol (PROAIR HFA;PROVENTIL HFA;VENTOLIN HFA) 90 mcg/actuation inhaler  cholecalciferol, vitamin D3, 1,000 unit (25 mcg) tablet  clopidogreL (PLAVIX) 75 mg tablet  coenzyme Q10 (CO Q-10) 100 mg capsule  fish oil-omega-3 fatty acids 300-1,000 mg capsule  FLAXSEED ORAL  GARLIC ORAL  glucosamine HCl 500 mg Tab  multivitamin-iron-folic acid  mg-mcg Tab  nitroglycerin (NITROSTAT) 0.4 MG SL tablet  pravastatin (PRAVACHOL) 40 MG tablet        ALLERGIES:  Allergies   Allergen Reactions     Chocolate Other (See Comments)     Raspberry Other (See Comments)     Strawberry Other (See Comments)       FAMILY HISTORY:  Family History   Problem Relation Age of Onset     Heart Failure Mother        SOCIAL HISTORY:   Social History     Socioeconomic History     Marital status:    Tobacco Use     Smoking status: Never     Smokeless tobacco: Never   Substance and Sexual Activity     Drug use: Never       VITALS:  Patient Vitals for the past 24 hrs:   BP Temp Temp src Pulse Resp SpO2 Weight   12/05/22 1036 (!) 170/77 -- -- 73 18 98 % --   12/05/22 0917 (!) 143/86 97.8  F (36.6  C) Oral 100 16 98 % 56.7 kg (125 lb)       PHYSICAL EXAM    GENERAL: Awake, alert.  In no acute distress.   HEENT: Normocephalic, atraumatic.  Pupils equal, round and reactive.  Conjunctiva normal.  EOMI.  TMs clear bilaterally.  NECK: No stridor or apparent deformity.  PULMONARY: Symmetrical breath sounds without distress.  Lungs clear to auscultation bilaterally without wheezes, rhonchi or rales.  CARDIO: Regular rate and rhythm.  No significant murmur, rub or gallop.  Radial  pulses strong and symmetrical.  ABDOMINAL: Abdomen soft, non-distended and non-tender to palpation.  No CVAT, no palpable hepatosplenomegaly.  EXTREMITIES: No lower extremity swelling or edema.    NEURO: Alert and oriented to person, place and time.  No focal motor deficit.  PSYCH: Normal mood and affect  SKIN: No rashes      National Institutes [employer] Stroke Scale  Exam Interval: Baseline   Score    Level of consciousness: (0)   Alert, keenly responsive    LOC questions: (0)   Answers both questions correctly    LOC commands: (0)   Performs both tasks correctly    Best gaze: (0)   Normal    Visual: (0)   No visual loss    Facial palsy: (1)   Minor paralysis (flat nasolabial fold, smile asymmetry)    Motor arm (left): (0)   No drift    Motor arm (right): (0)   No drift    Motor leg (left): (0)   No drift    Motor leg (right): (0)   No drift    Limb ataxia: (0)   Absent    Sensory: (1)   Mild to moderate sensory loss    Best language: (0)   Normal- no aphasia    Dysarthria: (0)   Normal    Extinction and inattention: (0)   No abnormality        Total Score:  2       LAB:  All pertinent labs reviewed and interpreted.  Results for orders placed or performed during the hospital encounter of 12/05/22   CTA Head Neck with Contrast    Impression    IMPRESSION:   HEAD CT:  1.  No finding for intracranial hemorrhage or mass.    2.  No definite finding for acute infarct. Note, however, is made of moderate presumed sequela of chronic microvascular ischemic change. In the absence of priors for comparison, small areas of acute or chronic white matter ischemia cannot be excluded. No   transcortical areas of low attenuation change.    HEAD CTA:   1.  Developmental variation of [place] of [place] without vascular cutoff, aneurysm, or flow-limiting stenosis.    2.  Minor atherosclerotic plaque both carotid siphons.    NECK CTA:  1.  No significant stenosis either cervical carotid or vertebral system. No findings for  dissection.    Noncontrast head CT findings and CTA vascular findings were called to Dr. Chavez at 9:36 and 9:46 AM, respectively.   MR Brain w/o & w Contrast    Impression    IMPRESSION:  1.  No acute intracranial finding. No evidence for recent ischemia, intracranial hemorrhage, or mass.  2.  Mild atrophy and mild to moderate burden of presumed chronic small vessel ischemic changes.   Basic metabolic panel   Result Value Ref Range    Sodium 140 136 - 145 mmol/L    Potassium 4.3 3.4 - 5.3 mmol/L    Chloride 102 98 - 107 mmol/L    Carbon Dioxide (CO2) 30 (H) 22 - 29 mmol/L    Anion Gap 8 7 - 15 mmol/L    Urea Nitrogen 14.0 8.0 - 23.0 mg/dL    Creatinine 0.73 0.51 - 0.95 mg/dL    Calcium 9.5 8.8 - 10.2 mg/dL    Glucose 115 (H) 70 - 99 mg/dL    GFR Estimate 80 >60 mL/min/1.73m2   Result Value Ref Range    INR 0.99 0.85 - 1.15   Partial thromboplastin time   Result Value Ref Range    aPTT 27 22 - 38 Seconds   Result Value Ref Range    Troponin T, High Sensitivity 8 <=14 ng/L   Glucose by meter   Result Value Ref Range    GLUCOSE BY METER POCT 120 (H) 70 - 99 mg/dL   CBC with platelets and differential   Result Value Ref Range    WBC Count 6.2 4.0 - 11.0 10e3/uL    RBC Count 5.09 3.80 - 5.20 10e6/uL    Hemoglobin 15.5 11.7 - 15.7 g/dL    Hematocrit 47.1 (H) 35.0 - 47.0 %    MCV 93 78 - 100 fL    MCH 30.5 26.5 - 33.0 pg    MCHC 32.9 31.5 - 36.5 g/dL    RDW 13.3 10.0 - 15.0 %    Platelet Count 213 150 - 450 10e3/uL    % Neutrophils 63 %    % Lymphocytes 26 %    % Monocytes 9 %    % Eosinophils 2 %    % Basophils 0 %    % Immature Granulocytes 0 %    NRBCs per 100 WBC 0 <1 /100    Absolute Neutrophils 3.9 1.6 - 8.3 10e3/uL    Absolute Lymphocytes 1.6 0.8 - 5.3 10e3/uL    Absolute Monocytes 0.5 0.0 - 1.3 10e3/uL    Absolute Eosinophils 0.1 0.0 - 0.7 10e3/uL    Absolute Basophils 0.0 0.0 - 0.2 10e3/uL    Absolute Immature Granulocytes 0.0 <=0.4 10e3/uL    Absolute NRBCs 0.0 10e3/uL       RADIOLOGY:  Reviewed all pertinent  imaging. Please see official radiology report.  MR Brain w/o & w Contrast   Final Result   IMPRESSION:   1.  No acute intracranial finding. No evidence for recent ischemia, intracranial hemorrhage, or mass.   2.  Mild atrophy and mild to moderate burden of presumed chronic small vessel ischemic changes.      CTA Head Neck with Contrast   Final Result   IMPRESSION:    HEAD CT:   1.  No finding for intracranial hemorrhage or mass.      2.  No definite finding for acute infarct. Note, however, is made of moderate presumed sequela of chronic microvascular ischemic change. In the absence of priors for comparison, small areas of acute or chronic white matter ischemia cannot be excluded. No    transcortical areas of low attenuation change.      HEAD CTA:    1.  Developmental variation of [place] of [place] without vascular cutoff, aneurysm, or flow-limiting stenosis.      2.  Minor atherosclerotic plaque both carotid siphons.      NECK CTA:   1.  No significant stenosis either cervical carotid or vertebral system. No findings for dissection.      Noncontrast head CT findings and CTA vascular findings were called to Dr. Chavez at 9:36 and 9:46 AM, respectively.            EKG:    Reviewed and interpreted as: EKG obtained 12/5/22 at 10:10 showing normal sinus rhythm, rate 72 bpm, no ST abnormalities.      I have independently reviewed and interpreted the EKG(s) documented above.        I, Mekhi Norman, am serving as a scribe to document services personally performed by Dr. Germania Chavez based on my observation and the provider's statements to me. IGermania MD attest that Mekhi Norman is acting in a scribe capacity, has observed my performance of the services and has documented them in accordance with my direction.         Germania Chavez MD  12/05/22 [id number]

## 2022-12-05 NOTE — ED NOTES
Patient reports she went to bed about 2100  last night-  Patient reports she has had left ear pain x 5 days and has been taking Ibuprofen and Tylenol for the pain.  Woke this am with left sided facial droop.

## 2022-12-05 NOTE — CONSULTS
[facility]    Stroke Telephone Note    I was called by Germania Chavez on 12/05/22 regarding patient Emely Coleman. The patient is a 87 year old female with PMH HLD, CAD. LKW 2100 last night (12/4). Woke up this morning (12/5) at 0700 with L ear pain, L facial droop including the lower face but also decreased L eye closure, R cheek slightly decreased sensation to touch compared to L.       Stroke Code Data (for stroke code without tele)  Stroke code activated 12/05/22   0925   Stroke provider first response  12/05/22 0927            Last known normal 12/04/22 2100        Time of discovery   (or onset of symptoms) 12/05/22   0700   Head CT read by Stroke Neuro Dr/Provider 12/05/22 0933   Was stroke code de-escalated? Yes 12/05/22 0945          Imaging Findings   HEAD CT:  1.  No finding for intracranial hemorrhage or mass.     2.  No definite finding for acute infarct. Note, however, is made of moderate presumed sequela of chronic microvascular ischemic change. In the absence of priors for comparison, small areas of acute or chronic white matter ischemia cannot be excluded. No   transcortical areas of low attenuation change.     HEAD CTA:   1.  Developmental variation of [place] of [place] without vascular cutoff, aneurysm, or flow-limiting stenosis.     2.  Minor atherosclerotic plaque both carotid siphons.     NECK CTA:  1.  No significant stenosis either cervical carotid or vertebral system. No findings for dissection.      Intravenous Thrombolysis  Not given due to:   - stroke mimic: suspected Bell Palsy  - unclear or unfavorable risk-benefit profile for extended window thrombolysis beyond the conventional 4.5 hour time window - deficits are non disabling    Endovascular Treatment  Not initiated due to absence of proximal vessel occlusion    Impression  L facial weakness involving lower face and with decreased eyelid closure, associated L ear pain and ?R cheek decreased sensation.  "By description may be Bell Palsy with hypersensitivity L cheek being perceived as decreased sensation R cheek. MRI of the brain recommended for further evaluation.    Recommendations   -MRI brain w/wo contrast. Notify stroke neurology if there is evidence of stroke on the MRI.      Discussed with stroke attending Dr. Mcgee.    My recommendations are based on the information provided over the phone by Emely Coleman's in-person providers. They are not intended to replace the clinical judgment of her in-person providers. I was not requested to personally see or examine the patient at this time.    Nayla Madison PA-C  Vascular Neurology    To page me or covering stroke neurology team member, click here: [facility]COM   Choose \"On Call\" tab at top, then search dropdown box for \"Neurology Adult\", select location, press Enter, then look for stroke/neuro ICU/telestroke.      "

## 2022-12-05 NOTE — ED TRIAGE NOTES
Patients presents here with complaint of left facial droop, right facial numbness that she noted upon waking at 7am this morning. No upper or lower extremity weakness or numbness.